# Patient Record
Sex: MALE | Race: WHITE | HISPANIC OR LATINO | URBAN - METROPOLITAN AREA
[De-identification: names, ages, dates, MRNs, and addresses within clinical notes are randomized per-mention and may not be internally consistent; named-entity substitution may affect disease eponyms.]

---

## 2022-05-16 ENCOUNTER — APPOINTMENT (RX ONLY)
Dept: URBAN - METROPOLITAN AREA CLINIC 15 | Facility: CLINIC | Age: 27
Setting detail: DERMATOLOGY
End: 2022-05-16

## 2022-05-16 VITALS — HEIGHT: 69 IN | WEIGHT: 165 LBS

## 2022-05-16 DIAGNOSIS — B07.8 OTHER VIRAL WARTS: ICD-10-CM

## 2022-05-16 PROCEDURE — 17110 DESTRUCTION B9 LES UP TO 14: CPT

## 2022-05-16 PROCEDURE — ? LIQUID NITROGEN

## 2022-05-16 PROCEDURE — ? PRESCRIPTION MEDICATION MANAGEMENT

## 2022-05-16 PROCEDURE — ? PRESCRIPTION

## 2022-05-16 PROCEDURE — 99203 OFFICE O/P NEW LOW 30 MIN: CPT | Mod: 25

## 2022-05-16 PROCEDURE — ? SEPARATE AND IDENTIFIABLE DOCUMENTATION

## 2022-05-16 PROCEDURE — ? COUNSELING

## 2022-05-16 RX ORDER — MUPIROCIN 20 MG/G
OINTMENT TOPICAL BID
Qty: 15 | Refills: 0 | COMMUNITY
Start: 2022-05-16

## 2022-05-16 RX ORDER — PHARMACY COMPOUNDING ACCESSORY
EACH MISCELLANEOUS QHS
Qty: 1 | Refills: 0 | Status: ERX | COMMUNITY
Start: 2022-05-16

## 2022-05-16 RX ADMIN — Medication 1: at 00:00

## 2022-05-16 RX ADMIN — MUPIROCIN 1: 20 OINTMENT TOPICAL at 00:00

## 2022-05-16 ASSESSMENT — LOCATION SIMPLE DESCRIPTION DERM: LOCATION SIMPLE: LEFT PLANTAR SURFACE

## 2022-05-16 ASSESSMENT — LOCATION ZONE DERM: LOCATION ZONE: FEET

## 2022-05-16 ASSESSMENT — LOCATION DETAILED DESCRIPTION DERM
LOCATION DETAILED: LEFT PLANTAR FOREFOOT OVERLYING 2ND METATARSAL
LOCATION DETAILED: LEFT PLANTAR FOREFOOT OVERLYING 1ST METATARSAL

## 2022-05-16 NOTE — PROCEDURE: PRESCRIPTION MEDICATION MANAGEMENT
Render In Strict Bullet Format?: No
Initiate Treatment: .\\n\\n\\nApply Vaseline, Aquaphor or topical antibiotic twice daily for 7 days. \\n\\nHow to use this medication:\\n\\n1. Apply medication at bedtime. \\n2. Apply very small amount of the medication to a flat plastic applicator. Use the applicator to apply a thin layer directly onto the wart. Use care to avoid applying the medicine to healthy skin. The medicine will break down healthy skin as well as the warts. \\n3. Occlude the wart with the tape provided. \\n4. Put the cap back tightly on the syringe. \\n5. Wash hands after applying the medicine. NEVER put the WARTpeel the mouth, nose, or eyes. \\n6. Remove the occlusion in the morning and was the area thoroughly. \\n7. In case of accidents, ingestion or control with eye,  nose, or mouth contact [facility] [address] or the local poison control center. \\n\\nWhat to expect:\\n\\nDuring the first few days of application the skin around the wart may swell and become white. This will slough off with continued applications. \\n\\nNormal Dosage: \\nThe medication is applied once daily for a time determined by your physician. \\n\\nStorage requirements:\\nStore this medication at room temperature. Keep out of reach of children. PROTECT FROM LIGHT. \\n\\nExpiration date: \\nThis medication is good for four months from the date made. Do not keep outdated medication. \\n\\nSide effects: \\nRash and irritation, if medication is applied to good skin. \\n\\nCautions and warning:\\nOnly apply the medication to the warts. Do not apply to good skin skin. Keep away from children. Do not use on nose, eyes, or mouth. \\n\\nDiscontinue 1 week prior to next office visit.
Detail Level: Zone

## 2022-05-16 NOTE — PROCEDURE: LIQUID NITROGEN
Render Note In Bullet Format When Appropriate: No
Show Topical Anesthesia Variable?: Yes
Medical Necessity Information: It is in your best interest to select a reason for this procedure from the list below. All of these items fulfill various CMS LCD requirements except the new and changing color options.
Number Of Freeze-Thaw Cycles: 3 freeze-thaw cycles
Medical Necessity Clause: This procedure was medically necessary because the lesions that were treated were:
Pared With?: curette
Spray Paint Text: The liquid nitrogen was applied to the skin utilizing a spray paint frosting technique.
Detail Level: Detailed
Post-Care Instructions: I reviewed with the patient in detail post-care instructions. Patient is to wear sunprotection, and avoid picking at any of the treated lesions. Pt may apply Vaseline to crusted or scabbing areas.
Consent: The patient's consent was obtained including but not limited to risks of crusting, scabbing, blistering, scarring, darker or lighter pigmentary change, recurrence, incomplete removal and infection.
Duration Of Freeze Thaw-Cycle (Seconds): 3

## 2022-06-16 ENCOUNTER — APPOINTMENT (RX ONLY)
Dept: URBAN - METROPOLITAN AREA CLINIC 15 | Facility: CLINIC | Age: 27
Setting detail: DERMATOLOGY
End: 2022-06-16

## 2022-06-16 DIAGNOSIS — L84 CORNS AND CALLOSITIES: ICD-10-CM

## 2022-06-16 DIAGNOSIS — B07.8 OTHER VIRAL WARTS: ICD-10-CM

## 2022-06-16 PROCEDURE — ? ADDITIONAL NOTES

## 2022-06-16 PROCEDURE — 17110 DESTRUCTION B9 LES UP TO 14: CPT

## 2022-06-16 PROCEDURE — 99213 OFFICE O/P EST LOW 20 MIN: CPT | Mod: 25

## 2022-06-16 PROCEDURE — ? SEPARATE AND IDENTIFIABLE DOCUMENTATION

## 2022-06-16 PROCEDURE — ? PRESCRIPTION MEDICATION MANAGEMENT

## 2022-06-16 PROCEDURE — ? LIQUID NITROGEN

## 2022-06-16 PROCEDURE — ? COUNSELING

## 2022-06-16 ASSESSMENT — LOCATION SIMPLE DESCRIPTION DERM
LOCATION SIMPLE: LEFT PLANTAR SURFACE
LOCATION SIMPLE: RIGHT PLANTAR SURFACE

## 2022-06-16 ASSESSMENT — LOCATION DETAILED DESCRIPTION DERM
LOCATION DETAILED: LEFT PLANTAR FOREFOOT OVERLYING 2ND METATARSAL
LOCATION DETAILED: LEFT PLANTAR FOREFOOT OVERLYING 1ST METATARSAL
LOCATION DETAILED: RIGHT PLANTAR FOREFOOT OVERLYING 4TH METATARSAL

## 2022-06-16 ASSESSMENT — LOCATION ZONE DERM: LOCATION ZONE: FEET

## 2022-06-16 NOTE — PROCEDURE: PRESCRIPTION MEDICATION MANAGEMENT
Render In Strict Bullet Format?: No
Continue Regimen: Apply Vaseline, Aquaphor or topical antibiotic twice daily for 7 days. \\n\\nHow to use this medication:\\n\\n1. Apply medication at bedtime. \\n2. Apply very small amount of the medication to a flat plastic applicator. Use the applicator to apply a thin layer directly onto the wart. Use care to avoid applying the medicine to healthy skin. The medicine will break down healthy skin as well as the warts. \\n3. Occlude the wart with the tape provided. \\n4. Put the cap back tightly on the syringe. \\n5. Wash hands after applying the medicine. NEVER put the WARTpeel the mouth, nose, or eyes. \\n6. Remove the occlusion in the morning and was the area thoroughly. \\n7. In case of accidents, ingestion or control with eye,  nose, or mouth contact [facility] [address] or the local poison control center. \\n\\nWhat to expect:\\n\\nDuring the first few days of application the skin around the wart may swell and become white. This will slough off with continued applications. \\n\\nNormal Dosage: \\nThe medication is applied once daily for a time determined by your physician. \\n\\nStorage requirements:\\nStore this medication at room temperature. Keep out of reach of children. PROTECT FROM LIGHT. \\n\\nExpiration date: \\nThis medication is good for four months from the date made. Do not keep outdated medication. \\n\\nSide effects: \\nRash and irritation, if medication is applied to good skin. \\n\\nCautions and warning:\\nOnly apply the medication to the warts. Do not apply to good skin skin. Keep away from children. Do not use on nose, eyes, or mouth.  \\n\\nDiscontinue 1 week prior to next office visit
Detail Level: Zone

## 2022-06-16 NOTE — PROCEDURE: ADDITIONAL NOTES
Additional Notes: .\\n-Recommended OTC Dr. Yonatan Ireland callus remover
Detail Level: Zone
Render Risk Assessment In Note?: no

## 2022-07-14 ENCOUNTER — APPOINTMENT (RX ONLY)
Dept: URBAN - METROPOLITAN AREA CLINIC 15 | Facility: CLINIC | Age: 27
Setting detail: DERMATOLOGY
End: 2022-07-14

## 2022-07-14 VITALS — WEIGHT: 165 LBS | HEIGHT: 69 IN

## 2022-07-14 DIAGNOSIS — B07.8 OTHER VIRAL WARTS: ICD-10-CM

## 2022-07-14 PROCEDURE — ? COUNSELING

## 2022-07-14 PROCEDURE — ? SEPARATE AND IDENTIFIABLE DOCUMENTATION

## 2022-07-14 PROCEDURE — ? LIQUID NITROGEN

## 2022-07-14 PROCEDURE — 17110 DESTRUCTION B9 LES UP TO 14: CPT

## 2022-07-14 PROCEDURE — 99213 OFFICE O/P EST LOW 20 MIN: CPT | Mod: 25

## 2022-07-14 ASSESSMENT — LOCATION SIMPLE DESCRIPTION DERM: LOCATION SIMPLE: LEFT PLANTAR SURFACE

## 2022-07-14 ASSESSMENT — LOCATION ZONE DERM: LOCATION ZONE: FEET

## 2022-07-14 NOTE — HPI: WARTS (VERRUCA)
How Severe Are Your Warts?: mild
Is This A New Presentation, Or A Follow-Up?: Wart
Additional History: Patient is in office for a wart on his left foot, he would like to get treated with liquid nitrogen.